# Patient Record
Sex: MALE | Race: WHITE | NOT HISPANIC OR LATINO | URBAN - METROPOLITAN AREA
[De-identification: names, ages, dates, MRNs, and addresses within clinical notes are randomized per-mention and may not be internally consistent; named-entity substitution may affect disease eponyms.]

---

## 2017-05-17 ENCOUNTER — IMPORTED ENCOUNTER (OUTPATIENT)
Dept: URBAN - METROPOLITAN AREA CLINIC 50 | Facility: CLINIC | Age: 77
End: 2017-05-17

## 2017-05-24 ENCOUNTER — IMPORTED ENCOUNTER (OUTPATIENT)
Dept: URBAN - METROPOLITAN AREA CLINIC 50 | Facility: CLINIC | Age: 77
End: 2017-05-24

## 2017-12-27 ENCOUNTER — IMPORTED ENCOUNTER (OUTPATIENT)
Dept: URBAN - METROPOLITAN AREA CLINIC 50 | Facility: CLINIC | Age: 77
End: 2017-12-27

## 2018-06-27 ENCOUNTER — IMPORTED ENCOUNTER (OUTPATIENT)
Dept: URBAN - METROPOLITAN AREA CLINIC 50 | Facility: CLINIC | Age: 78
End: 2018-06-27

## 2019-02-06 ENCOUNTER — IMPORTED ENCOUNTER (OUTPATIENT)
Dept: URBAN - METROPOLITAN AREA CLINIC 50 | Facility: CLINIC | Age: 79
End: 2019-02-06

## 2019-02-06 NOTE — PATIENT DISCUSSION
"""Continue Artificial tears both eyes two - four times a day ."" ""Continue Warm compresses both eyes two - four times a day . """

## 2019-09-04 ENCOUNTER — IMPORTED ENCOUNTER (OUTPATIENT)
Dept: URBAN - METROPOLITAN AREA CLINIC 50 | Facility: CLINIC | Age: 79
End: 2019-09-04

## 2020-03-11 ENCOUNTER — IMPORTED ENCOUNTER (OUTPATIENT)
Dept: URBAN - METROPOLITAN AREA CLINIC 50 | Facility: CLINIC | Age: 80
End: 2020-03-11

## 2020-11-03 ENCOUNTER — IMPORTED ENCOUNTER (OUTPATIENT)
Dept: URBAN - METROPOLITAN AREA CLINIC 50 | Facility: CLINIC | Age: 80
End: 2020-11-03

## 2021-04-18 ASSESSMENT — TONOMETRY
OD_IOP_MMHG: 14
OS_IOP_MMHG: 16
OS_IOP_MMHG: 18
OS_IOP_MMHG: 14
OD_IOP_MMHG: 14
OD_IOP_MMHG: 17
OD_IOP_MMHG: 18
OD_IOP_MMHG: 16
OD_IOP_MMHG: 18
OS_IOP_MMHG: 17
OD_IOP_MMHG: 14
OS_IOP_MMHG: 17
OS_IOP_MMHG: 18
OS_IOP_MMHG: 14
OS_IOP_MMHG: 13
OD_IOP_MMHG: 15

## 2021-04-18 ASSESSMENT — VISUAL ACUITY
OS_SC: 20/25+3
OS_SC: 20/30
OD_SC: 20/25
OS_SC: 20/25
OD_SC: 20/30-
OD_CC: J1+
OD_SC: 20/25
OD_SC: 20/25-
OS_CC: J1+
OD_SC: 20/25-
OD_CC: J1+
OS_CC: J1+
OS_SC: 20/25-
OS_SC: 20/30-
OD_CC: J1+
OS_SC: 20/30
OD_CC: J1+@ 15 IN
OS_SC: 20/25
OS_CC: J1+
OS_SC: 20/25-
OD_SC: 20/30
OD_SC: 20/20-1
OS_CC: J1+@ 13 IN
OS_CC: J1+@ 15 IN
OD_SC: 20/25
OD_CC: J1+@ 13 IN

## 2021-05-14 ENCOUNTER — PREPPED CHART (OUTPATIENT)
Dept: URBAN - METROPOLITAN AREA CLINIC 52 | Facility: CLINIC | Age: 81
End: 2021-05-14

## 2021-05-17 ENCOUNTER — COMPREHENSIVE EXAM (OUTPATIENT)
Dept: URBAN - METROPOLITAN AREA CLINIC 52 | Facility: CLINIC | Age: 81
End: 2021-05-17

## 2021-05-17 DIAGNOSIS — H35.3132: ICD-10-CM

## 2021-05-17 DIAGNOSIS — H43.813: ICD-10-CM

## 2021-05-17 DIAGNOSIS — H35.371: ICD-10-CM

## 2021-05-17 PROCEDURE — 92014 COMPRE OPH EXAM EST PT 1/>: CPT

## 2021-05-17 PROCEDURE — 92134 CPTRZ OPH DX IMG PST SGM RTA: CPT

## 2021-05-17 ASSESSMENT — VISUAL ACUITY
OS_SC: 20/25-
OU_CC: J1+/-
OD_SC: 20/25-

## 2021-05-17 ASSESSMENT — TONOMETRY
OS_IOP_MMHG: 18
OD_IOP_MMHG: 18

## 2021-08-24 ENCOUNTER — PROBLEM (OUTPATIENT)
Dept: URBAN - METROPOLITAN AREA CLINIC 53 | Facility: CLINIC | Age: 81
End: 2021-08-24

## 2021-08-24 DIAGNOSIS — H43.813: ICD-10-CM

## 2021-08-24 DIAGNOSIS — H35.3132: ICD-10-CM

## 2021-08-24 DIAGNOSIS — H35.371: ICD-10-CM

## 2021-08-24 DIAGNOSIS — H04.123: ICD-10-CM

## 2021-08-24 PROCEDURE — 92134 CPTRZ OPH DX IMG PST SGM RTA: CPT

## 2021-08-24 PROCEDURE — 92014 COMPRE OPH EXAM EST PT 1/>: CPT

## 2021-08-24 ASSESSMENT — TONOMETRY
OD_IOP_MMHG: 17
OS_IOP_MMHG: 18

## 2021-08-24 ASSESSMENT — VISUAL ACUITY
OD_SC: 20/25-2
OS_PH: 20/25
OS_SC: 20/30

## 2021-08-24 NOTE — PATIENT DISCUSSION
Discussed with patient that this could be causing his blurred vision.  Recommend patient start artificial tears 3 times a day in both eyes.  sample of refresh relieva given.

## 2022-09-07 ENCOUNTER — ESTABLISHED PATIENT (OUTPATIENT)
Dept: URBAN - METROPOLITAN AREA CLINIC 52 | Facility: CLINIC | Age: 82
End: 2022-09-07

## 2022-09-07 DIAGNOSIS — H35.371: ICD-10-CM

## 2022-09-07 DIAGNOSIS — H04.123: ICD-10-CM

## 2022-09-07 DIAGNOSIS — H35.3132: ICD-10-CM

## 2022-09-07 DIAGNOSIS — H43.813: ICD-10-CM

## 2022-09-07 PROCEDURE — 92014 COMPRE OPH EXAM EST PT 1/>: CPT

## 2022-09-07 PROCEDURE — 92134 CPTRZ OPH DX IMG PST SGM RTA: CPT

## 2022-09-07 ASSESSMENT — VISUAL ACUITY
OD_PH: 20/30
OU_CC: J1+
OS_PH: 20/30

## 2022-09-07 ASSESSMENT — KERATOMETRY
OS_K1POWER_DIOPTERS: 43.75
OS_AXISANGLE2_DEGREES: 77
OD_K2POWER_DIOPTERS: 43.75
OS_K2POWER_DIOPTERS: 44.00
OS_AXISANGLE_DEGREES: 167
OD_AXISANGLE2_DEGREES: 13
OD_K1POWER_DIOPTERS: 43.00
OD_AXISANGLE_DEGREES: 103

## 2022-09-07 ASSESSMENT — TONOMETRY
OS_IOP_MMHG: 17
OD_IOP_MMHG: 16

## 2022-09-07 NOTE — PATIENT DISCUSSION
Occasional doubling at all distances but comes and goes and last no longer than 5mins at a time. Do not feel that this would require prism being placed into MRX. Patient also it could be in correlation with Ocular Migraines from time to time.

## 2022-09-07 NOTE — PATIENT DISCUSSION
Patient having hard time reading with OTC +2.50 readers. Instructed patient on the differences between increasing add power or using a magnifier to make the letters to appear larger. Patient defers magnifier at this time and will continue with readers.

## 2022-09-07 NOTE — PATIENT DISCUSSION
Patient prefers [facility] with Dr. Heriberto Dejesus secondary to it being closer to home. Will have patient follow up with Dr. Heriberto Dejesus in [place].

## 2023-10-03 ENCOUNTER — COMPREHENSIVE EXAM (OUTPATIENT)
Dept: URBAN - METROPOLITAN AREA CLINIC 53 | Facility: CLINIC | Age: 83
End: 2023-10-03

## 2023-10-03 DIAGNOSIS — H35.373: ICD-10-CM

## 2023-10-03 DIAGNOSIS — H02.836: ICD-10-CM

## 2023-10-03 DIAGNOSIS — H35.3132: ICD-10-CM

## 2023-10-03 DIAGNOSIS — H04.123: ICD-10-CM

## 2023-10-03 DIAGNOSIS — D31.32: ICD-10-CM

## 2023-10-03 DIAGNOSIS — H43.813: ICD-10-CM

## 2023-10-03 DIAGNOSIS — H02.833: ICD-10-CM

## 2023-10-03 DIAGNOSIS — S05.10XA: ICD-10-CM

## 2023-10-03 PROCEDURE — 92134 CPTRZ OPH DX IMG PST SGM RTA: CPT

## 2023-10-03 PROCEDURE — 99214 OFFICE O/P EST MOD 30 MIN: CPT

## 2023-10-03 ASSESSMENT — VISUAL ACUITY
OU_CC: J1@16"
OS_SC: 20/50-1
OS_PH: 20/30
OD_PH: 20/25-2
OD_SC: 20/40-1

## 2023-10-03 ASSESSMENT — TONOMETRY
OD_IOP_MMHG: 19
OS_IOP_MMHG: 20

## 2024-11-20 ENCOUNTER — COMPREHENSIVE EXAM (OUTPATIENT)
Dept: URBAN - METROPOLITAN AREA CLINIC 53 | Facility: CLINIC | Age: 84
End: 2024-11-20

## 2024-11-20 DIAGNOSIS — H35.373: ICD-10-CM

## 2024-11-20 DIAGNOSIS — H35.3132: ICD-10-CM

## 2024-11-20 DIAGNOSIS — H40.1132: ICD-10-CM

## 2024-11-20 PROCEDURE — 92134 CPTRZ OPH DX IMG PST SGM RTA: CPT

## 2024-11-20 PROCEDURE — 99214 OFFICE O/P EST MOD 30 MIN: CPT

## 2024-11-20 RX ORDER — LATANOPROST 50 UG/ML
1 SOLUTION/ DROPS OPHTHALMIC EVERY EVENING
Start: 2024-11-20

## 2025-01-08 ENCOUNTER — FOLLOW UP (OUTPATIENT)
Age: 85
End: 2025-01-08

## 2025-01-08 DIAGNOSIS — H40.1134: ICD-10-CM

## 2025-01-08 PROCEDURE — 76514 ECHO EXAM OF EYE THICKNESS: CPT

## 2025-01-08 PROCEDURE — 99213 OFFICE O/P EST LOW 20 MIN: CPT

## 2025-01-08 PROCEDURE — 92133 CPTRZD OPH DX IMG PST SGM ON: CPT

## 2025-01-10 ENCOUNTER — DIAGNOSTICS ONLY (OUTPATIENT)
Age: 85
End: 2025-01-10

## 2025-01-10 DIAGNOSIS — H40.1131: ICD-10-CM

## 2025-01-10 PROCEDURE — 92083 EXTENDED VISUAL FIELD XM: CPT

## 2025-01-10 PROCEDURE — 92133 CPTRZD OPH DX IMG PST SGM ON: CPT
